# Patient Record
Sex: FEMALE | Race: WHITE | NOT HISPANIC OR LATINO | Employment: FULL TIME | URBAN - METROPOLITAN AREA
[De-identification: names, ages, dates, MRNs, and addresses within clinical notes are randomized per-mention and may not be internally consistent; named-entity substitution may affect disease eponyms.]

---

## 2018-03-26 ENCOUNTER — AMBULATORY - HEALTHEAST (OUTPATIENT)
Dept: FAMILY MEDICINE | Facility: CLINIC | Age: 48
End: 2018-03-26

## 2018-03-26 ENCOUNTER — COMMUNICATION - HEALTHEAST (OUTPATIENT)
Dept: FAMILY MEDICINE | Facility: CLINIC | Age: 48
End: 2018-03-26

## 2018-03-26 DIAGNOSIS — N92.0 MENORRHAGIA: ICD-10-CM

## 2018-04-02 ENCOUNTER — OFFICE VISIT - HEALTHEAST (OUTPATIENT)
Dept: FAMILY MEDICINE | Facility: CLINIC | Age: 48
End: 2018-04-02

## 2018-04-02 DIAGNOSIS — N93.9 EXCESSIVE VAGINAL BLEEDING: ICD-10-CM

## 2018-04-02 DIAGNOSIS — Z00.00 HEALTHCARE MAINTENANCE: ICD-10-CM

## 2018-04-02 LAB
ERYTHROCYTE [DISTWIDTH] IN BLOOD BY AUTOMATED COUNT: 10.5 % (ref 11–14.5)
HCT VFR BLD AUTO: 37 % (ref 35–47)
HGB BLD-MCNC: 12.6 G/DL (ref 12–16)
MCH RBC QN AUTO: 33 PG (ref 27–34)
MCHC RBC AUTO-ENTMCNC: 34.2 G/DL (ref 32–36)
MCV RBC AUTO: 97 FL (ref 80–100)
PLATELET # BLD AUTO: 363 THOU/UL (ref 140–440)
PMV BLD AUTO: 7.4 FL (ref 7–10)
RBC # BLD AUTO: 3.83 MILL/UL (ref 3.8–5.4)
TSH SERPL DL<=0.005 MIU/L-ACNC: 2.13 UIU/ML (ref 0.3–5)
WBC: 6.2 THOU/UL (ref 4–11)

## 2018-04-05 ENCOUNTER — HOSPITAL ENCOUNTER (OUTPATIENT)
Dept: ULTRASOUND IMAGING | Facility: HOSPITAL | Age: 48
Discharge: HOME | End: 2018-04-05
Attending: FAMILY MEDICINE

## 2018-04-05 DIAGNOSIS — N93.9 EXCESSIVE VAGINAL BLEEDING: ICD-10-CM

## 2018-04-24 ENCOUNTER — HOSPITAL ENCOUNTER (OUTPATIENT)
Dept: MAMMOGRAPHY | Facility: CLINIC | Age: 48
Discharge: HOME | End: 2018-04-24
Attending: FAMILY MEDICINE

## 2018-04-24 DIAGNOSIS — Z00.00 HEALTHCARE MAINTENANCE: ICD-10-CM

## 2018-06-25 ENCOUNTER — RECORDS - HEALTHEAST (OUTPATIENT)
Dept: ADMINISTRATIVE | Facility: OTHER | Age: 48
End: 2018-06-25

## 2018-08-09 ENCOUNTER — COMMUNICATION - HEALTHEAST (OUTPATIENT)
Dept: FAMILY MEDICINE | Facility: CLINIC | Age: 48
End: 2018-08-09

## 2019-05-20 ENCOUNTER — HOSPITAL ENCOUNTER (OUTPATIENT)
Dept: MAMMOGRAPHY | Facility: CLINIC | Age: 49
Discharge: HOME | End: 2019-05-20
Attending: FAMILY MEDICINE

## 2019-05-20 DIAGNOSIS — Z12.31 VISIT FOR SCREENING MAMMOGRAM: ICD-10-CM

## 2019-10-30 ENCOUNTER — COMMUNICATION - HEALTHEAST (OUTPATIENT)
Dept: FAMILY MEDICINE | Facility: CLINIC | Age: 49
End: 2019-10-30

## 2020-01-07 ENCOUNTER — OFFICE VISIT - HEALTHEAST (OUTPATIENT)
Dept: INTERNAL MEDICINE | Facility: CLINIC | Age: 50
End: 2020-01-07

## 2020-01-07 DIAGNOSIS — R05.9 COUGH: ICD-10-CM

## 2020-01-07 DIAGNOSIS — J32.9 RHINOSINUSITIS: ICD-10-CM

## 2020-01-07 RX ORDER — ALBUTEROL SULFATE 90 UG/1
2 AEROSOL, METERED RESPIRATORY (INHALATION) EVERY 4 HOURS PRN
Qty: 1 EACH | Refills: 11 | Status: SHIPPED | OUTPATIENT
Start: 2020-01-07 | End: 2023-01-03

## 2020-01-07 RX ORDER — AZITHROMYCIN 250 MG/1
TABLET, FILM COATED ORAL
Qty: 6 TABLET | Refills: 0 | Status: SHIPPED | OUTPATIENT
Start: 2020-01-07 | End: 2023-01-03

## 2021-02-02 ENCOUNTER — RECORDS - HEALTHEAST (OUTPATIENT)
Dept: ADMINISTRATIVE | Facility: OTHER | Age: 51
End: 2021-02-02

## 2021-02-11 ENCOUNTER — HOSPITAL ENCOUNTER (OUTPATIENT)
Dept: MAMMOGRAPHY | Facility: CLINIC | Age: 51
Discharge: HOME | End: 2021-02-11
Attending: OBSTETRICS & GYNECOLOGY

## 2021-02-11 DIAGNOSIS — N64.89 FULLNESS OF BREAST: ICD-10-CM

## 2021-04-13 ENCOUNTER — COMMUNICATION - HEALTHEAST (OUTPATIENT)
Dept: FAMILY MEDICINE | Facility: CLINIC | Age: 51
End: 2021-04-13

## 2021-05-30 ENCOUNTER — RECORDS - HEALTHEAST (OUTPATIENT)
Dept: ADMINISTRATIVE | Facility: CLINIC | Age: 51
End: 2021-05-30

## 2021-06-01 VITALS — BODY MASS INDEX: 26.12 KG/M2 | WEIGHT: 160.6 LBS

## 2021-06-04 VITALS
OXYGEN SATURATION: 98 % | HEART RATE: 68 BPM | DIASTOLIC BLOOD PRESSURE: 82 MMHG | TEMPERATURE: 97.7 F | BODY MASS INDEX: 25.53 KG/M2 | SYSTOLIC BLOOD PRESSURE: 124 MMHG | WEIGHT: 157 LBS

## 2021-06-05 NOTE — PATIENT INSTRUCTIONS - HE
Please follow up if you have any further issues.    You may contact me by phone or [facility] if you are worsening or if things are not improving.    Thank you for coming in today.

## 2021-06-17 NOTE — PROGRESS NOTES
ASSESSMENT/ PLAN    1. Excessive vaginal bleeding  Going on for the last 3 weeks. Dysfunctional bleeding. Will check lab today to ensure there's no significant blood loss. Exam unremarkable except mild spotting from cervical os.  Patient otherwise appears well normal vital signs.  She is hesitant in taking progesterone for fear of side effects.  She does have a scheduled visit with Dr. Raiza Gomez at [facility] on May 4, 2018 for further discussion of possible ablation.  Will get US pelvis to work up structural abnormality.   - US Pelvis With Transvaginal Non OB; Future  - HM2(CBC w/o Differential)  - Thyroid [place]    2. Healthcare maintenance  - Mammo Screening Bilateral; Future      This note was created using Dragon dictation software, spelling errors may occur.     Nae Mabry MD        SUBJECTIVE   Erin Coello is a 47 y.o. old female with a past medical history of celiac disease who presented to clinic today for further evaluation of fatigue and dizziness and continuous vaginal bleeding.  PCP is Dr. Chiang whom she last saw about 2 years ago.  She is here today for complaint of continuous vaginal bleeding for the last 3 weeks.  She has never had a similar problem in the past.  Her periods have always been regular lasting 3-4 days without any cramps.  She denies any cramp with this current bleeding episode.  For a few days she felt dizzy and lightheaded and fatigued however today she feels quite good.  She has not passed out.  She denies any shortness of breath particularly with exertion.  She has been in touch with Dr. Chiang about her vaginal bleeding and was recommended to take progesterone daily for 10 days to help reset her cycle however patient is worried about possible side effects with progesterone so she has not started taking this medication yet.  Patient would like a pelvic ultrasound order to ensure there is no structural problem and as a matter of fact she does have an  upcoming appointment with Dr. Raiza Gomez at  [facility] on May 4, 2018 to discuss ablation for her vaginal bleeding.  No family history of bleeding disorder.  She has had breast reduction surgery and tolerated it okay without any bleeding issue post surgery.    Review of Systems:  Negative except as noted in HPI      The following portions of the patient's history were reviewed and updated as appropriate: past medical history, past surgical history, family history, allergies, current medications and problem list.    Medical History  Active Ambulatory (Non-Hospital) Problems    Diagnosis     Menorrhagia     Impetigo     Nontropical (Celiac) Sprue     Ovarian Cyst     No past medical history on file.    Surgical History  She  has a past surgical history that includes pr reduction of large breast and Reduction mammaplasty (1995).    Social History  Reviewed, and she  reports that she has never smoked. She has never used smokeless tobacco.    Family History  Reviewed, and family history includes Coronary artery disease in her maternal grandfather and maternal uncle; Hypertension in her father and mother; Multiple myeloma in her maternal grandmother.    Medications  Reviewed and reconciled    Allergies  Allergies   Allergen Reactions     Amoxicillin Hives     Sulfa (Sulfonamide Antibiotics)          OBJECTIVE  Physical Exam:  Vital signs: /68 (Patient Site: Right Arm, Patient Position: Sitting, Cuff Size: Adult Regular)  Pulse 84  Temp 97.7  F (36.5  C) (Oral)   Resp 16  Wt 160 lb 9.6 oz (72.8 kg)  BMI 26.12 kg/m2  Weight: 160 lb 9.6 oz (72.8 kg)    General appearance: pleasant, appears stated age, cooperative and in no distress  Eyes: EOMs intact, PERRL, conjunctivae normal.   ENT: moist oral mucosa, posterior oropharynx normal, thyroid normal to palpation, no lump or mass.  Lymph: no cervical/supraclavicular adenopathy  Respiratory: clear to auscultation bilaterally, good air movement  throughout, no wheezing or crackles, speaking full sentences without difficulty  Cardiovascular: regular rate and rhythm, no murmur appreciated, no leg edema  Abdomen: active bowel sounds, soft, non-tender, non-distended, no CVA tenderness on percussion.  : Mild bleeding from cervical loss the vaginal canal otherwise normal, no cervical motion or adnexal tenderness.  Uterus does not seem to be enlarged.  Skin: no rashes  Neuro: alert oriented x 3, grossly normal otherwise  Psych: normal affect, appropriate conversation

## 2021-06-26 ENCOUNTER — HEALTH MAINTENANCE LETTER (OUTPATIENT)
Age: 51
End: 2021-06-26

## 2021-06-28 NOTE — PROGRESS NOTES
Progress Notes by Anand Morales MD at 1/7/2020  9:40 AM     Author: Anand Morales MD Service: -- Author Type: Physician    Filed: 1/8/2020  9:43 PM Encounter Date: 1/7/2020 Status: Signed    : Anand Morales MD (Physician)              [facility]    Comprehensive and complex medical care - Chronic disease management - Shared decision making - Care coordination - Compassionate care    Patient advocacy - Rational deprescribing - Minimally disruptive medicine - Ethical focus - Customized care          Date of Service: 1/7/2020  Primary Provider: Nae Mabry MD    Patient Care Team:  Nae Mabry MD as PCP - General (Family Medicine)  Nae Mabry MD as Assigned PCP     ______________________________________________________________________     Patient's Pharmacy:    [id number] - [place], [place] - [address] [place] [address] E  [address] [place] [address] E  [facility] [address]  Phone: [phone] Fax: [phone]    [id number] - [address] [address]  Phone: [phone] Fax: [phone]     Patient's Contacts:  Name Home Phone Work Phone Mobile Phone Relationship Lgl Grd   RIKKI ASTUDILLO [phone] Spouse    TAI OLIVAS   [phone] Mother      Patient's Insurance:    Payor: BLUE CROSS / Plan: BLUE CROSS OUT OF STATE / Product Type: PPO/POS/FFS /           Erin BUCK Oumou is a 49 y.o. female who comes in today for:    Chief Complaint   Patient presents with   ? Cough     1/1/20, STARTED AS A DRY COUGH NOW PRODUCTIVE COUGH, DEEP AND WHEEZING, LOW 99 FEVER ON AND OFF       Active Problem List:  Problem List as of 1/7/2020 Reviewed: 4/8/2016  9:42 AM by Cyn Chiang MD       Unprioritized    Impetigo    Menorrhagia    Nontropical (Celiac) Sprue    Ovarian Cyst           Current Outpatient Medications   Medication Sig   ? cholecalciferol, vitamin D3, 1,000 unit  tablet Take 1,000 Units by mouth daily.   ? albuterol (PROAIR HFA;PROVENTIL HFA;VENTOLIN HFA) 90 mcg/actuation inhaler Inhale 2 puffs every 4 (four) hours as needed for wheezing. May substitute the equivalent medication per insurance preference.   ? azithromycin (ZITHROMAX Z-RILEY) 250 MG tablet 2 tablets today and then 1 pill a day for 4 days.     Social History     Social History Narrative   ? Not on file       Subjective:     New to Internal Medicine (IM).    Comes in with a cough of 7 days duration.  Has had congestion in the nose and sinuses for the last 5 days.   and kids have been sick.  Mainly in today because concerned about pneumonia as it seems to be moving into the chest.  No history of allergies or asthma.  Inhaler has helped for this in the past but no recent prescription.  Dry air seems to make worse.  Using mucinex without much help.  Pressure in ears.  Not much sore throat.  Feels worse over the week.    We reviewed her other issues noted in the assessment but not specifically addressed in the HPI above.     On review of systems, the patient denies any chest pain or shortness of breath.    Objective:     Wt Readings from Last 3 Encounters:   01/07/20 157 lb (71.2 kg)   04/02/18 160 lb 9.6 oz (72.8 kg)   04/08/16 153 lb 1.3 oz (69.4 kg)     BP Readings from Last 3 Encounters:   01/07/20 124/82   04/02/18 126/68   04/08/16 112/72     /82   Pulse 68   Temp 97.7  F (36.5  C) (Oral)   Wt 157 lb (71.2 kg)   SpO2 98%   BMI 25.53 kg/m     Patient is comfortable, no apparent distress.  Mood good.  Insight good.  Ears - clear.  Nose exam shows severe rhinitis.  Throat - clear.  Neck is supple, there is no cervical adenopathy.  No thyromegaly.  Heart regular rate and rhythm.  Lungs are clear to auscultation bilaterally.  Respiratory effort is good.     Diagnostics:     Results for orders placed or performed in visit on 04/02/18   HM2(CBC w/o Differential)   Result Value Ref Range    WBC 6.2 4.0  - 11.0 thou/uL    RBC 3.83 3.80 - 5.40 mill/uL    Hemoglobin 12.6 12.0 - 16.0 g/dL    Hematocrit 37.0 35.0 - 47.0 %    MCV 97 80 - 100 fL    MCH 33.0 27.0 - 34.0 pg    MCHC 34.2 32.0 - 36.0 g/dL    RDW 10.5 (L) 11.0 - 14.5 %    Platelets 363 140 - 440 thou/uL    MPV 7.4 7.0 - 10.0 fL   Thyroid Cascade   Result Value Ref Range    TSH 2.13 0.30 - 5.00 uIU/mL       Assessment:     1. Rhinosinusitis    2. Cough        Quality review:     PHQ-2 Total Score: 0 (1/7/2020 10:05 AM)    No data recorded  ______________________________________________________________________     BMI Readings from Last 1 Encounters:   01/07/20 25.53 kg/m        Plan:     1. Albuterol inhaler for cough.  2. Discussed over the counter (OTC) medications to help with rhinosinusitis symptoms.  3. Given a paper prescription for azithromycin (Zithromax) if symptoms worsen and last beyond 10-14 days.  4. She does want to avoid antibiotics if possible.      Anand Morales MD  General Internal Medicine  [facility]    Return in about 1 year (around 1/7/2021) for follow up with your primary care provider.     No future appointments.      ______________________________________________________________________     Relevant ICD-10 codes and order associations:      ICD-10-CM    1. Rhinosinusitis J32.9 azithromycin (ZITHROMAX Z-RILEY) 250 MG tablet   2. Cough R05 albuterol (PROAIR HFA;PROVENTIL HFA;VENTOLIN HFA) 90 mcg/actuation inhaler

## 2021-10-16 ENCOUNTER — HEALTH MAINTENANCE LETTER (OUTPATIENT)
Age: 51
End: 2021-10-16

## 2022-05-22 ENCOUNTER — HEALTH MAINTENANCE LETTER (OUTPATIENT)
Age: 52
End: 2022-05-22

## 2022-06-29 ENCOUNTER — ANCILLARY PROCEDURE (OUTPATIENT)
Dept: MAMMOGRAPHY | Facility: CLINIC | Age: 52
End: 2022-06-29
Attending: FAMILY MEDICINE
Payer: COMMERCIAL

## 2022-06-29 DIAGNOSIS — Z12.31 VISIT FOR SCREENING MAMMOGRAM: ICD-10-CM

## 2022-06-29 PROCEDURE — 77067 SCR MAMMO BI INCL CAD: CPT

## 2022-08-14 ENCOUNTER — HEALTH MAINTENANCE LETTER (OUTPATIENT)
Age: 52
End: 2022-08-14

## 2022-11-19 ENCOUNTER — HEALTH MAINTENANCE LETTER (OUTPATIENT)
Age: 52
End: 2022-11-19

## 2022-11-21 ENCOUNTER — TRANSFERRED RECORDS (OUTPATIENT)
Dept: HEALTH INFORMATION MANAGEMENT | Facility: CLINIC | Age: 52
End: 2022-11-21

## 2022-11-30 ENCOUNTER — IMMUNIZATION (OUTPATIENT)
Dept: FAMILY MEDICINE | Facility: CLINIC | Age: 52
End: 2022-11-30
Payer: COMMERCIAL

## 2022-11-30 DIAGNOSIS — Z23 NEED FOR PROPHYLACTIC VACCINATION AND INOCULATION AGAINST INFLUENZA: Primary | ICD-10-CM

## 2022-11-30 PROCEDURE — 90471 IMMUNIZATION ADMIN: CPT

## 2022-11-30 PROCEDURE — 90682 RIV4 VACC RECOMBINANT DNA IM: CPT

## 2022-11-30 PROCEDURE — 99207 PR NO CHARGE NURSE ONLY: CPT

## 2022-12-05 ENCOUNTER — E-VISIT (OUTPATIENT)
Dept: URGENT CARE | Facility: CLINIC | Age: 52
End: 2022-12-05
Payer: COMMERCIAL

## 2022-12-05 DIAGNOSIS — J20.9 ACUTE BRONCHITIS WITH SYMPTOMS > 10 DAYS: Primary | ICD-10-CM

## 2022-12-05 PROCEDURE — 99421 OL DIG E/M SVC 5-10 MIN: CPT | Performed by: NURSE PRACTITIONER

## 2022-12-05 RX ORDER — DOXYCYCLINE HYCLATE 100 MG
100 TABLET ORAL 2 TIMES DAILY
Qty: 14 TABLET | Refills: 0 | Status: SHIPPED | OUTPATIENT
Start: 2022-12-05 | End: 2022-12-12

## 2022-12-05 NOTE — PATIENT INSTRUCTIONS
"    Dear Erin Coello    After reviewing your responses, I've been able to diagnose you with \"Bronchitis\" which is a common infection of your lungs. While this is most commonly caused by a virus, the symptoms you have given suggest you should be treated with antibiotics.     I have sent doxycyline to your pharmacy to treat this infection.     It is important that you take all of your prescribed medication even if your symptoms are improving after a few doses. Taking all of your medicine helps prevent the symptoms from returning.     If your symptoms worsen, you develop chest pain or shortness of breath, fevers over 101, or are not improving in 5 days, please contact your primary care provider for an appointment or visit any of our convenient Walk-in Care or Urgent Care Centers to be seen which can be found on our website here.    Thanks again for choosing us as your health care partner,    KHUSHBOO Corado CNP    "

## 2022-12-10 ENCOUNTER — E-VISIT (OUTPATIENT)
Dept: URGENT CARE | Facility: CLINIC | Age: 52
End: 2022-12-10
Payer: COMMERCIAL

## 2022-12-10 DIAGNOSIS — J20.9 ACUTE BRONCHITIS WITH SYMPTOMS > 10 DAYS: Primary | ICD-10-CM

## 2022-12-10 PROCEDURE — 99421 OL DIG E/M SVC 5-10 MIN: CPT | Performed by: PHYSICIAN ASSISTANT

## 2022-12-10 RX ORDER — ALBUTEROL SULFATE 90 UG/1
2 AEROSOL, METERED RESPIRATORY (INHALATION) EVERY 6 HOURS PRN
Qty: 18 G | Refills: 0 | Status: SHIPPED | OUTPATIENT
Start: 2022-12-10 | End: 2022-12-14

## 2022-12-10 RX ORDER — PREDNISONE 20 MG/1
20 TABLET ORAL 2 TIMES DAILY
Qty: 10 TABLET | Refills: 0 | Status: SHIPPED | OUTPATIENT
Start: 2022-12-10 | End: 2022-12-14

## 2022-12-10 NOTE — PATIENT INSTRUCTIONS
"  Dear Erin Coello    After reviewing your responses, I've been able to diagnose you with \"Bronchitis\" which is a common infection of your lungs that is nearly always caused by a virus. The virus causes swelling and irritation of the air passages of your lungs which leads to cough. The illness spreads from your nose and throat to your windpipe and airways. It is often called a \"chest cold\" and can last up to 2 weeks, but is not a serious illness. Exposure to cigarette smoke usually makes this significantly worse.      To treat bronchitis, the main thing to do is drink lots of fluids and rest. Cough medications over-the-counter such as mucinex, robitussin or \"cold and sinus\" medications can be helpful. Ibuprofen and Tylenol also help with fevers or aching feelings that you often have with this kind of illness. Do not take ibuprofen if you have kidney disease, stomach ulcers or allergy to aspirin.     Bronchitis is most often highly contagious as viruses are spread through the air or touch. Avoid contact with others who may become infected, particularly children, the elderly and those whose immune systems might be weak.     If your symptoms worsen, you develop chest pain or shortness of breath, fevers over 101, or are not improving in 5 days, please contact your primary care provider for an appointment or visit any of our convenient Walk-in Care or Urgent Care Centers to be seen which can be found on our website here.    Thanks again for choosing us as your health care partner,    Sammy Smith PA-C  "

## 2022-12-13 ENCOUNTER — E-VISIT (OUTPATIENT)
Dept: URGENT CARE | Facility: CLINIC | Age: 52
End: 2022-12-13
Payer: COMMERCIAL

## 2022-12-13 DIAGNOSIS — R05.1 ACUTE COUGH: Primary | ICD-10-CM

## 2022-12-13 PROCEDURE — 99207 PR NON-BILLABLE SERV PER CHARTING: CPT | Performed by: EMERGENCY MEDICINE

## 2022-12-13 NOTE — PATIENT INSTRUCTIONS
Dear Erin Coello,    We are sorry you are not feeling well. Based on the responses you provided, it is recommended that you be seen in-person in urgent care so we can better evaluate your symptoms. Please click here to find the nearest urgent care location to you.   You will not be charged for this Visit. Thank you for trusting us with your care.    Pawan Dickens MD

## 2022-12-14 ENCOUNTER — OFFICE VISIT (OUTPATIENT)
Dept: FAMILY MEDICINE | Facility: CLINIC | Age: 52
End: 2022-12-14
Payer: COMMERCIAL

## 2022-12-14 ENCOUNTER — TELEPHONE (OUTPATIENT)
Dept: FAMILY MEDICINE | Facility: CLINIC | Age: 52
End: 2022-12-14

## 2022-12-14 VITALS
SYSTOLIC BLOOD PRESSURE: 171 MMHG | HEART RATE: 84 BPM | DIASTOLIC BLOOD PRESSURE: 108 MMHG | BODY MASS INDEX: 26.56 KG/M2 | RESPIRATION RATE: 16 BRPM | TEMPERATURE: 98 F | OXYGEN SATURATION: 98 % | WEIGHT: 163.3 LBS

## 2022-12-14 DIAGNOSIS — J20.9 ACUTE BRONCHITIS WITH SYMPTOMS > 10 DAYS: ICD-10-CM

## 2022-12-14 DIAGNOSIS — J18.9 PNEUMONIA OF LEFT LOWER LOBE DUE TO INFECTIOUS ORGANISM: Primary | ICD-10-CM

## 2022-12-14 PROCEDURE — 99213 OFFICE O/P EST LOW 20 MIN: CPT | Performed by: FAMILY MEDICINE

## 2022-12-14 RX ORDER — PREDNISONE 20 MG/1
TABLET ORAL
Qty: 10 TABLET | Refills: 0 | Status: SHIPPED | OUTPATIENT
Start: 2022-12-14 | End: 2023-01-03

## 2022-12-14 RX ORDER — ALBUTEROL SULFATE 90 UG/1
2 AEROSOL, METERED RESPIRATORY (INHALATION) EVERY 6 HOURS PRN
Qty: 18 G | Refills: 3 | Status: SHIPPED | OUTPATIENT
Start: 2022-12-14

## 2022-12-14 RX ORDER — BENZONATATE 200 MG/1
200 CAPSULE ORAL 3 TIMES DAILY PRN
Qty: 21 CAPSULE | Refills: 1 | Status: SHIPPED | OUTPATIENT
Start: 2022-12-14

## 2022-12-14 RX ORDER — LEVOFLOXACIN 750 MG/1
750 TABLET, FILM COATED ORAL DAILY
Qty: 5 TABLET | Refills: 0 | Status: SHIPPED | OUTPATIENT
Start: 2022-12-14 | End: 2022-12-19

## 2022-12-14 NOTE — TELEPHONE ENCOUNTER
Reason for Call:  Other call back    Detailed comments: Erin went to the [facility] walk-in [facility] today 12/14/22 and saw Dr. Zepeda, who prescribed her Levofloxacin. Pt has some concerns about the side effects and would like to ask a few questions about the medication, one of them being that she's also taking a steroid, which she found out could increase the side effects for the Levofloxacin so she was wondering if there's any other antibiotic she could take or if this is the best one for her. She would like to get a call back if possible so they can talk about the medication.    Phone Number Patient can be reached at: Cell number on file:    Telephone Information:   Mobile [phone]       Best Time: anytime    Can we leave a detailed message on this number? YES    Call taken on 12/14/2022 at 4:21 PM by Gracy Valdez

## 2022-12-14 NOTE — PATIENT INSTRUCTIONS
Albuterol inhaler instructions given.  2 puffs TID for 10-14 days and prn and then as needed..      I will send refill for prednisone.    Take antibiotic as directed.    Afrin nasal spray for 3-5 days for post nasal drainage.  May continue with the flonase.

## 2022-12-14 NOTE — PROGRESS NOTES
OUTPATIENT VISIT NOTE                                                   Date of Visit: 12/14/2022     Chief Complaint   Patient presents with:  Cough: Pt states started 1 month cough,wheezing and sob            History of Present Illness   Erin Coello is a 52 year old female c/o cough for the last month.  Getting worse.  E-visit and took azithromycin.--no improvement. Got prednisone three days ago.    No fever.  Feels like she has something to cough up.  Mild chest tightness.  No chest pain.    Last week had a lot of nasal congestion with a lot post nasal drip.                 MEDICATIONS   Current Outpatient Medications   Medication     albuterol (PROAIR HFA/PROVENTIL HFA/VENTOLIN HFA) 108 (90 Base) MCG/ACT inhaler     albuterol (PROAIR HFA;PROVENTIL HFA;VENTOLIN HFA) 90 mcg/actuation inhaler     cholecalciferol, vitamin D3, 1,000 unit tablet     levofloxacin (LEVAQUIN) 750 MG tablet     predniSONE (DELTASONE) 20 MG tablet     azithromycin (ZITHROMAX Z-RILEY) 250 MG tablet     No current facility-administered medications for this visit.         SOCIAL HISTORY   Social History     Tobacco Use     Smoking status: Never     Smokeless tobacco: Never   Substance Use Topics     Alcohol use: Not on file           Physical Exam   Vitals:    12/14/22 1046   BP: (!) 171/108   BP Location: Right arm   Patient Position: Sitting   Cuff Size: Adult Regular   Pulse: 84   Resp: 16   Temp: 98  F (36.7  C)   TempSrc: Oral   SpO2: 98%   Weight: 74.1 kg (163 lb 4.8 oz)        GENERAL:   Alert. Oriented.  EYES: Clear  HENT:  Ears: R TM pearly gray. Normal landmarks. L TM pearly gray.  Normal landmarks  Nose: Clear.  Sinuses: Nontender.  Oropharynx:  No erythema. No exudate.  NECK: Supple. No adenopathy.  LUNGS: rales and rhonchi throughout left lung field. Some wheezing  HEART: RRR  SKIN:  No rash.          Assessment and Plan     Pneumonia of left lower lobe due to infectious organism  Pneumonia by clinical exam.  Didn't improve  with zithromax.  Change to levoquin due to pcn allergy.  She will complete the original 5 days of prednisone.  Can take another couple of days, but warned of interaction of quinolones and prednisone.  Albuterol inhaler instructions given.  2 puffs TID for 10-14 days and prn and then as needed..   - levofloxacin (LEVAQUIN) 750 MG tablet  Dispense: 5 tablet; Refill: 0    - predniSONE (DELTASONE) 20 MG tablet  Dispense: 10 tablet; Refill: 0  - albuterol (PROAIR HFA/PROVENTIL HFA/VENTOLIN HFA) 108 (90 Base) MCG/ACT inhaler  Dispense: 18 g; Refill: 3     Recheck if worsening.  Otherwise in 4 weeks with primary.              Discussed signs / symptoms that warrant urgent / emergent medical attention.     Recheck if worsening or not improving.       Mary Zepeda MD          Pertinent History     The following portions of the patient's history were reviewed and updated as appropriate: allergies, current medications, past family history, past medical history, past social history, past surgical history and problem list.

## 2022-12-15 NOTE — TELEPHONE ENCOUNTER
Called patient.  Discussed tendinopathy that can occur with quinolones.  She has two more doses to take with her previous dose of prednisone.  I think she can safely take those.  Then if she is starting to improve, doesn't need to take any more prednisone.  Watch for soreness at site of muscle insertions/tendons and talk to her doctor if they occur.

## 2023-01-03 ENCOUNTER — OFFICE VISIT (OUTPATIENT)
Dept: FAMILY MEDICINE | Facility: CLINIC | Age: 53
End: 2023-01-03
Payer: COMMERCIAL

## 2023-01-03 VITALS
TEMPERATURE: 98.8 F | BODY MASS INDEX: 26.99 KG/M2 | DIASTOLIC BLOOD PRESSURE: 88 MMHG | RESPIRATION RATE: 16 BRPM | WEIGHT: 162 LBS | HEART RATE: 92 BPM | HEIGHT: 65 IN | OXYGEN SATURATION: 99 % | SYSTOLIC BLOOD PRESSURE: 144 MMHG

## 2023-01-03 DIAGNOSIS — R05.8 OTHER COUGH: ICD-10-CM

## 2023-01-03 DIAGNOSIS — N76.1 SUBACUTE VAGINITIS: Primary | ICD-10-CM

## 2023-01-03 PROCEDURE — 91312 COVID-19 VACCINE BIVALENT BOOSTER 12+ (PFIZER): CPT | Performed by: FAMILY MEDICINE

## 2023-01-03 PROCEDURE — 99214 OFFICE O/P EST MOD 30 MIN: CPT | Performed by: FAMILY MEDICINE

## 2023-01-03 PROCEDURE — 0124A COVID-19 VACCINE BIVALENT BOOSTER 12+ (PFIZER): CPT | Performed by: FAMILY MEDICINE

## 2023-01-03 RX ORDER — FLUCONAZOLE 150 MG/1
150 TABLET ORAL ONCE
Qty: 1 TABLET | Refills: 0 | Status: SHIPPED | OUTPATIENT
Start: 2023-01-03 | End: 2023-01-03

## 2023-01-03 NOTE — PROGRESS NOTES
"  Assessment & Plan     ICD-10-CM    1. Subacute vaginitis  N76.1 fluconazole (DIFLUCAN) 150 MG tablet      2. Other cough  R05.8         Medical decision making: Patient presents today for follow-up of respiratory infection.  She was treated with azithromycin with a ED visit and then a course of prednisone.  She ended up going to urgent care where she was treated based on her clinical signs and symptoms for pneumonia and asked to follow-up.  I reviewed her ED visits and urgent care visit.  No lab or imaging was pursued.  She still has some cough but the deep cough is now resolved.  Since then she has developed yeast vaginitis and OTC medication cause some burning.  She still has some symptoms.  Discussed treatment with fluconazole.  Continue to monitor And if does not resolve totally, needs to come back and imaging should be pursued.  Blood pressure is slightly elevated.  Possibly from prednisone.  Keep a check at home/pharmacy and follow-up if persistent elevation.  Follows for her routine health care with OB/GYN clinic and is up-to-date with her Pap, lipid screening.  She has an appointment for [facility] and will pursue colonoscopy.         BMI:   Estimated body mass index is 26.96 kg/m  as calculated from the following:    Height as of this encounter: 1.651 m (5' 5\").    Weight as of this encounter: 73.5 kg (162 lb).       MEDICATIONS:   Orders Placed This Encounter   Medications     fluconazole (DIFLUCAN) 150 MG tablet     Sig: Take 1 tablet (150 mg) by mouth once for 1 dose     Dispense:  1 tablet     Refill:  0          - Continue other medications without change  See Patient Instructions    Return in about 1 year (around 1/3/2024) for Routine preventive.    Annamaria Campbell MD  [facility]    Donna Khan is a 52 year old, presenting for the following health issues:  Follow Up (Was seen at urgent care for pneumonia 12/14/22 - still having slight cough ) and Vaginal Problem " "(Did OTC Monistat treatment )      History of Present Illness       Reason for visit:  Pneumonia follow up    She eats 4 or more servings of fruits and vegetables daily.She consumes 1 sweetened beverage(s) daily.She exercises with enough effort to increase her heart rate 20 to 29 minutes per day.  She exercises with enough effort to increase her heart rate 5 days per week.   She is taking medications regularly.     Patient Active Problem List   Diagnosis     Nontropical (Celiac) Sprue     Ovarian Cyst     Current Outpatient Medications   Medication     albuterol (PROAIR HFA/PROVENTIL HFA/VENTOLIN HFA) 108 (90 Base) MCG/ACT inhaler     benzonatate (TESSALON) 200 MG capsule     cholecalciferol, vitamin D3, 1,000 unit tablet     fluconazole (DIFLUCAN) 150 MG tablet     albuterol (PROAIR HFA;PROVENTIL HFA;VENTOLIN HFA) 90 mcg/actuation inhaler     azithromycin (ZITHROMAX Z-RILEY) 250 MG tablet     predniSONE (DELTASONE) 20 MG tablet     No current facility-administered medications for this visit.         Review of Systems   Constitutional, HEENT, cardiovascular, pulmonary, gi and gu systems are negative, except as otherwise noted.      Objective    BP (!) 144/88   Pulse 92   Temp 98.8  F (37.1  C) (Oral)   Resp 16   Ht 1.651 m (5' 5\")   Wt 73.5 kg (162 lb)   SpO2 99%   BMI 26.96 kg/m    Body mass index is 26.96 kg/m .  Physical Exam   GENERAL: healthy, alert and no distress  EYES: Eyes grossly normal to inspection, PERRL and conjunctivae and sclerae normal  HENT: ear canals and TM's normal, nose and mouth without ulcers or lesions  NECK: no adenopathy, no asymmetry, masses, or scars and thyroid normal to palpation  RESP: lungs clear to auscultation - no rales, rhonchi or wheezes  CV: regular rate and rhythm, normal S1 S2, no S3 or S4, no murmur, click or rub, no peripheral edema and peripheral pulses strong  ABDOMEN: soft, nontender, no hepatosplenomegaly, no masses and bowel sounds normal  MS: no gross " musculoskeletal defects noted, no edema

## 2023-01-05 ENCOUNTER — TRANSFERRED RECORDS (OUTPATIENT)
Dept: HEALTH INFORMATION MANAGEMENT | Facility: CLINIC | Age: 53
End: 2023-01-05
Payer: COMMERCIAL

## 2023-05-23 ENCOUNTER — TRANSFERRED RECORDS (OUTPATIENT)
Dept: HEALTH INFORMATION MANAGEMENT | Facility: CLINIC | Age: 53
End: 2023-05-23
Payer: COMMERCIAL

## 2023-09-10 ENCOUNTER — HEALTH MAINTENANCE LETTER (OUTPATIENT)
Age: 53
End: 2023-09-10

## 2024-06-18 ENCOUNTER — TRANSFERRED RECORDS (OUTPATIENT)
Dept: MULTI SPECIALTY CLINIC | Facility: CLINIC | Age: 54
End: 2024-06-18

## 2024-06-24 ENCOUNTER — E-VISIT (OUTPATIENT)
Dept: URGENT CARE | Facility: CLINIC | Age: 54
End: 2024-06-24
Payer: COMMERCIAL

## 2024-06-24 DIAGNOSIS — R06.2 WHEEZING: Primary | ICD-10-CM

## 2024-06-24 PROCEDURE — 99421 OL DIG E/M SVC 5-10 MIN: CPT | Performed by: FAMILY MEDICINE

## 2024-06-24 RX ORDER — PREDNISONE 20 MG/1
40 TABLET ORAL DAILY
Qty: 10 TABLET | Refills: 0 | Status: SHIPPED | OUTPATIENT
Start: 2024-06-24 | End: 2024-06-29

## 2024-06-24 NOTE — PATIENT INSTRUCTIONS
Thank you for choosing us for your care. I have placed an order for a prescription so that you can start treatment. View your full visit summary for details by clicking on the link below. Your pharmacist will able to address any questions you may have about the medication.     I have sent prednisone which you have mentioned helped in the past with this, and with this usually you will notice a relief within 1-2 days. If not please come in person to be evaluated to make sure you do not have pneumonia. Please come in if your symptoms are worsening.     If you're not feeling better within 5-7 days, please schedule an appointment.  You can schedule an appointment right here in [facility], or call [phone]  If the visit is for the same symptoms as your eVisit, we'll refund the cost of your eVisit if seen within seven days.

## 2024-09-10 ENCOUNTER — LAB REQUISITION (OUTPATIENT)
Dept: LAB | Facility: CLINIC | Age: 54
End: 2024-09-10
Payer: COMMERCIAL

## 2024-09-10 DIAGNOSIS — D49.2 NEOPLASM OF UNSPECIFIED BEHAVIOR OF BONE, SOFT TISSUE, AND SKIN: ICD-10-CM

## 2024-09-10 PROCEDURE — 88305 TISSUE EXAM BY PATHOLOGIST: CPT | Mod: 26 | Performed by: DERMATOLOGY

## 2024-09-10 PROCEDURE — 88305 TISSUE EXAM BY PATHOLOGIST: CPT | Mod: TC,ORL | Performed by: DERMATOLOGY

## 2024-09-12 ENCOUNTER — TRANSFERRED RECORDS (OUTPATIENT)
Dept: MULTI SPECIALTY CLINIC | Facility: CLINIC | Age: 54
End: 2024-09-12

## 2024-09-12 LAB
PAP SMEAR - HIM PATIENT REPORTED: NEGATIVE
PATH REPORT.COMMENTS IMP SPEC: NORMAL
PATH REPORT.COMMENTS IMP SPEC: NORMAL
PATH REPORT.FINAL DX SPEC: NORMAL
PATH REPORT.GROSS SPEC: NORMAL
PATH REPORT.MICROSCOPIC SPEC OTHER STN: NORMAL
PATH REPORT.RELEVANT HX SPEC: NORMAL

## 2024-11-03 ENCOUNTER — HEALTH MAINTENANCE LETTER (OUTPATIENT)
Age: 54
End: 2024-11-03

## 2025-04-14 ENCOUNTER — E-VISIT (OUTPATIENT)
Dept: URGENT CARE | Facility: CLINIC | Age: 55
End: 2025-04-14
Payer: COMMERCIAL

## 2025-04-14 DIAGNOSIS — R69 DIAGNOSIS UNKNOWN: ICD-10-CM

## 2025-04-14 DIAGNOSIS — R21 RASH AND NONSPECIFIC SKIN ERUPTION: Primary | ICD-10-CM

## 2025-04-14 PROCEDURE — 99207 PR NON-BILLABLE SERV PER CHARTING: CPT | Performed by: FAMILY MEDICINE

## 2025-04-14 NOTE — PATIENT INSTRUCTIONS
Dear Erin,    We are sorry you are not feeling well. Based on the responses you provided, it is recommended that you be seen in-person in clinic so we can better evaluate your symptoms. Please schedule this visit in [facility]. You will have a Schedule Now button in [email or web address] to help with scheduling this appointment. Otherwise, you can call [phone] to schedule an appointment.     You will not be charged for this eVisit. Thank you for trusting us with your care.     Birgit Neil MD

## 2025-04-17 ENCOUNTER — PATIENT OUTREACH (OUTPATIENT)
Dept: CARE COORDINATION | Facility: CLINIC | Age: 55
End: 2025-04-17
Payer: COMMERCIAL

## 2025-05-07 ENCOUNTER — RESULTS FOLLOW-UP (OUTPATIENT)
Dept: FAMILY MEDICINE | Facility: CLINIC | Age: 55
End: 2025-05-07